# Patient Record
Sex: FEMALE | Race: WHITE | HISPANIC OR LATINO | Employment: UNEMPLOYED | URBAN - METROPOLITAN AREA
[De-identification: names, ages, dates, MRNs, and addresses within clinical notes are randomized per-mention and may not be internally consistent; named-entity substitution may affect disease eponyms.]

---

## 2023-03-17 ENCOUNTER — OFFICE VISIT (OUTPATIENT)
Dept: FAMILY MEDICINE CLINIC | Facility: CLINIC | Age: 48
End: 2023-03-17

## 2023-03-17 VITALS
DIASTOLIC BLOOD PRESSURE: 76 MMHG | TEMPERATURE: 98.1 F | OXYGEN SATURATION: 98 % | BODY MASS INDEX: 21.26 KG/M2 | HEART RATE: 64 BPM | WEIGHT: 120 LBS | HEIGHT: 63 IN | SYSTOLIC BLOOD PRESSURE: 124 MMHG | RESPIRATION RATE: 16 BRPM

## 2023-03-17 DIAGNOSIS — Z12.31 ENCOUNTER FOR SCREENING MAMMOGRAM FOR MALIGNANT NEOPLASM OF BREAST: ICD-10-CM

## 2023-03-17 DIAGNOSIS — M25.562 CHRONIC PAIN OF BOTH KNEES: ICD-10-CM

## 2023-03-17 DIAGNOSIS — Z13.1 DIABETES MELLITUS SCREENING: ICD-10-CM

## 2023-03-17 DIAGNOSIS — M54.50 CHRONIC BILATERAL LOW BACK PAIN WITHOUT SCIATICA: Primary | ICD-10-CM

## 2023-03-17 DIAGNOSIS — Z13.220 LIPID SCREENING: ICD-10-CM

## 2023-03-17 DIAGNOSIS — G89.29 CHRONIC PAIN OF BOTH KNEES: ICD-10-CM

## 2023-03-17 DIAGNOSIS — E55.9 VITAMIN D DEFICIENCY: ICD-10-CM

## 2023-03-17 DIAGNOSIS — M25.561 CHRONIC PAIN OF BOTH KNEES: ICD-10-CM

## 2023-03-17 DIAGNOSIS — G89.29 CHRONIC BILATERAL LOW BACK PAIN WITHOUT SCIATICA: Primary | ICD-10-CM

## 2023-03-17 PROBLEM — Z59.89 UNINSURED: Status: ACTIVE | Noted: 2023-03-17

## 2023-03-17 PROBLEM — Z59.71 UNINSURED: Status: ACTIVE | Noted: 2023-03-17

## 2023-03-17 NOTE — ASSESSMENT & PLAN NOTE
-Use Tylenol XR prn + Voltaren gel  -May use voltaren gel daily if prn is ineffective    -Encouraged Heat/Ice application  -Discussed regular exercise for skeletal strengthening   -Encouraged using a tibiofemoral knee brace

## 2023-03-17 NOTE — PROGRESS NOTES
Name: Alma Varela      : 1975      MRN: [id number]  Encounter Provider: KIESHA Moraes  Encounter Date: 3/17/2023   Encounter department: [address] [place] [address]     1  Chronic bilateral low back pain without sciatica  Assessment & Plan:  - Recommend PT, heat/ice application, home exercises  - May use tylenol 500 mg prn   - Advised pt to use naproxen prn if tylenol is ineffective  Orders:  -     Diclofenac Sodium (VOLTAREN) 1 %; Apply 2 g topically 4 (four) times a day    2  Encounter for screening mammogram for malignant neoplasm of breast  -     Mammo screening bilateral w 3d & cad; Future; Expected date: 2023  -     Ambulatory Referral to Social Work Care Management Program; Future    3  Chronic pain of both knees  Assessment & Plan:  -Use Tylenol XR prn + Voltaren gel  -May use voltaren gel daily if prn is ineffective    -Encouraged Heat/Ice application  -Discussed regular exercise for skeletal strengthening   -Encouraged using a tibiofemoral knee brace  Orders:  -     Diclofenac Sodium (VOLTAREN) 1 %; Apply 2 g topically 4 (four) times a day    4  Diabetes mellitus screening  -     Hemoglobin A1C; Future  -     Basic metabolic panel; Future    5  Lipid screening  -     Lipid Panel with Direct LDL reflex; Future    6  Vitamin D deficiency  -     Vitamin D 25 hydroxy; Future         Subjective        Alma Varela is a 50 y o  female  has no past medical history on file  has no past surgical history on file  Pt presents today to establish care  Knee Pain  Patient presents with knee pain involving both knees  Onset of the symptoms was several months ago  Inciting event: none known  Current symptoms include crepitus sensation, pain located generalized throughout anterior knee and stiffness  Pain is aggravated by kneeling and squatting  Patient has had no prior knee problems  Evaluation to date: none   Treatment to date: none  Back Pain  Patient presents for evaluation of low back problems  Symptoms have been present for 6 months  Denies numbness, paresthesias, stiffness and weakness  Initial inciting event: none  Symptoms are worse in the morning  Alleviating factors identifiable by the patient are none  Aggravating factors identifiable by the patient are bending forwards  Treatments initiated by the patient: heating pad which helps  Previous lower back problems: none  Previous work up: none  Review of Systems   Constitutional: Negative for chills and fever  HENT: Negative for ear pain and sore throat  Eyes: Negative for pain and visual disturbance  Respiratory: Negative for cough and shortness of breath  Cardiovascular: Negative for chest pain and palpitations  Gastrointestinal: Negative for abdominal pain and vomiting  Genitourinary: Negative for dysuria and hematuria  Musculoskeletal: Positive for arthralgias, back pain and myalgias  Skin: Negative for color change and rash  Neurological: Negative for seizures and syncope  All other systems reviewed and are negative  No current outpatient medications on file prior to visit  Objective     /76 (BP Location: Left arm, Patient Position: Sitting, Cuff Size: Standard)   Pulse 64   Temp 98 1 °F (36 7 °C) (Temporal)   Resp 16   Ht 5' 3" (1 6 m)   Wt 54 4 kg (120 lb)   SpO2 98%   BMI 21 26 kg/m²     Physical Exam  Vitals and nursing note reviewed  HENT:      Head: Normocephalic and atraumatic  Right Ear: External ear normal       Left Ear: External ear normal       Nose: Nose normal    Eyes:      Extraocular Movements: Extraocular movements intact  Conjunctiva/sclera: Conjunctivae normal       Pupils: Pupils are equal, round, and reactive to light  Cardiovascular:      Rate and Rhythm: Normal rate and regular rhythm  Pulses: Normal pulses  Heart sounds: Normal heart sounds     Pulmonary:      Effort: Pulmonary effort is normal       Breath sounds: Normal breath sounds  Abdominal:      General: Bowel sounds are normal       Palpations: Abdomen is soft  Tenderness: There is no abdominal tenderness  Musculoskeletal:         General: Normal range of motion  Cervical back: Normal and normal range of motion  Thoracic back: Normal       Lumbar back: Normal  Negative right straight leg raise test and negative left straight leg raise test       Right knee: Bony tenderness and crepitus present  Left knee: Bony tenderness and crepitus present  Skin:     General: Skin is warm and dry  Neurological:      General: No focal deficit present  Mental Status: She is alert and oriented to person, place, and time  Mental status is at baseline  Psychiatric:         Mood and Affect: Mood normal          Behavior: Behavior normal          Thought Content:  Thought content normal          Judgment: Judgment normal        Jennfier Mari

## 2023-03-17 NOTE — ASSESSMENT & PLAN NOTE
- Recommend PT, heat/ice application, home exercises  - May use tylenol 500 mg prn   - Advised pt to use naproxen prn if tylenol is ineffective

## 2023-03-17 NOTE — PATIENT INSTRUCTIONS
Ejercicios para la espalda baja   CUIDADO AMBULATORIO:   Los ejercicios para la espalda baja ayudan a sanar y a fortalecer los músculos de morales espalda para evitar otra lesión  Pregúntele a morales médico si usted necesita acudir con un fisioterapeuta para que le indique ejercicios más avanzado  Busque atención médica de inmediato si:  Usted tiene dolor severo que le impide moverse  Llame a morales médico si:  Morales dolor empeora  Usted tiene un dolor nuevo  Usted tiene preguntas o inquietudes acerca de morales condición o cuidado  Realice los ejercicios para la espalda baja de manera tobin:  Charmaine los ejercicios sobre tami colchoneta o superficie firme (no sobre tami cama)  Tami superficie firme sostendrá morales columna y evitará el dolor lumbar  Muévase lenta y suavemente  Evite movimientos rápidos o bruscos  Respire normalmente  No contenga la respiración  Deténgase si siente dolor  Es normal sentir alguna molestia al principio, octavio no debería sentir dolor  Practicar los ejercicios con regularidad ayudará a disminuir morales incomodidad con el paso del [place]  Ejercicios para la espalda baja: Morales médico podría recomendarle que realice ejercicios para la espalda de 10 a 30 minutos cada día  También podría recomendarle que charmaine ejercicios de 1 a 3 veces cada día  Pregunte a morales médico cuáles ejercicios son los mejores para usted y con qué frecuencia hacerlos  Bombeo del tobillo: Acuéstese boca arriba  Levante morales pie (con carol dedos apuntando hacia morales vibha)  Luego, baje morales pie (con los dedos apuntando lejos de usted)  Repita yashira ejercicio 10 veces en cada lado  Deslizamiento de talón: Acuéstese boca arriba  Muy despacio doble tami pierna y luego enderécela  Luego, doble la otra pierna y enderécela  Repita 10 veces en cada lado  Inclinación pélvica: Acuéstese boca arriba con carol rodillas dobladas y carol pies planos sobre el piso  Coloque carol brazos en tami posición relajada junto a morales cuerpo   Contraiga los músculos de morales abdomen y aplane morales espalda contra el piso  Sostenga está posición por 5 segundos  Repita 5 veces  Estiramiento de la espalda: Acuéstese boca arriba con carol brian detrás de morales vibha  Doble carol rodillas y gire la mitad de morales cuerpo hacia un lado  Mantenga esta posición por 10 segundos  Repita 3 veces en cada lado  Levantamiento de la pierna estirada: Acuéstese boca Raiza Karie con tami pierna estirada  Doble la otra rodilla  Contraiga morales abdomen y luego levante lentamente la pierna estirada entre 6 a 12 pulgadas del piso  Mantenga esta posición por 1 a 5 segundos  Baje morales pierna lentamente  Repita 10 veces en cada pierna  Rodillas al pecho: Acuéstese boca arriba con carol rodillas dobladas y carol pies planos sobre el piso  Denia Bream tami de las rodillas hacia morales pecho y sosténgala por 5 segundos  Regrese morales pierna a la posición inicial  Levante la otra rodilla hacia el pecho y sosténgala por 5 segundos  Mk esto 5 veces en cada lado  Posición luis camello: Coloque carol brian y [employer]  Arquee morales espalda [place], hacia el techo y [place]  Arquee morales esme dorsal lo más posible  Sostenga está posición por 5 segundos  Levante morales vibha hacia arriba y baje morales pecho hacia el piso  Sostenga está posición por 5 segundos  Mk 3 series o vinh se le indique  Posición de cuclillas contra la pared: Párese con morales espalda contra la pared  Contraiga los músculos de morales abdomen  Lentamente deslice morales cuerpo hasta que carol rodillas queden dobladas en un ángulo de 45 grados  Mantenga esta posición por 5 segundos  Deslice lentamente morales espalda hacia arriba hasta quedar de pie  Repita 10 veces  Posición de acurrucarse: Acuéstese boca arriba con carol rodillas dobladas y carol pies planos sobre el piso  Coloque carol brian con las yenifer hacia abajo debajo de la curva de la parte baja de morales espalda   Después, con carol codos [facility], levante carol hombros y pecho entre 2 a 3 pulgadas  Mantenga morales vibha a la misma altura de carol hombros  Mantenga esta posición por 5 segundos  Cuando usted pueda hacer yashira ejercicio sin sentir dolor por 10 a 15 segundos, puede entonces añadir tami rotación  Mientras carol hombros y morales pecho estén levantados del piso, voltee levemente hacia la izquierda y [place]  Repita en el otro lado  Ejercicio pájaro andrzej: Coloque carol brian y rodillas sobre el piso  Mantenga carol muñecas directamente debajo de carol hombros y carol rodillas directamente debajo de carol caderas  Contraiga morales ombligo hacia adentro en dirección a morales columna  No estire ni arquee morales espalda  Ponga tensos carol músculos abdominales  Levante un brazo extendido para que se alinee con morales vibha  Luego, levante la pierna opuesta a morales brazo  Mantenga esta posición por 15 segundos  Baje morales Flonnie Push y pierna lentamente y [place] de lado  Mk 5 series  Acuda a la consulta de control con morales médico según las indicaciones: Anote carol preguntas para que se acuerde de hacerlas fredy carol visitas  © Copyright Omelia Passy 2022 Information is for End User's use only and may not be sold, redistributed or otherwise used for commercial purposes  Esta información es sólo para uso en educación  Morales intención no es darle un consejo médico sobre enfermedades o tratamientos  Colsulte con morales [place] farmacéutico antes de seguir cualquier régimen médico para saber si es seguro y efectivo para usted

## 2023-07-27 ENCOUNTER — PATIENT OUTREACH (OUTPATIENT)
Dept: FAMILY MEDICINE CLINIC | Facility: CLINIC | Age: 48
End: 2023-07-27

## 2023-07-27 NOTE — PROGRESS NOTES
Chart review indicates that an order was placed 3/17/23 to follow up with uninsured patient who needs mammogram. No prior OP SWCM involvement found. Guarantor notes indicate that patient has approved [address] SFS application from 3/60/71-0/36/06. SWCM outreached patient via [employer] ID # [id number], but patient did not  and unable to leave . Attempted x2. SWCM to try again at another time.

## 2023-08-29 ENCOUNTER — PATIENT OUTREACH (OUTPATIENT)
Dept: FAMILY MEDICINE CLINIC | Facility: CLINIC | Age: 48
End: 2023-08-29

## 2023-08-29 NOTE — PROGRESS NOTES
Catheter inserted. Chart review indicates that an order was placed 3/17/23 to follow up with uninsured patient who needs mammogram. No prior OP [facility] involvement found. Guarantor notes indicate that patient has approved [facility] SFS application from 8/15/89-1/50/51. SW outreached patient via [employer] ID # [id number] on 7/27/23, but patient did not  and unable to leave . Attempted x2. SW attempted patient again today via [employer] AL#[id number] to her spouse phone as she has none on file, patient/spuse did not  and unable to leave . At this time due to two unsuccessful attempts and no upcoming scheduled appointment [facility] will have colleague send unable to reach letter to the home. Patient does not have an e-mail on file or active My Chart. [facility] will close out case as this time, but remain available in the future as needed.

## 2023-08-30 ENCOUNTER — PATIENT OUTREACH (OUTPATIENT)
Dept: FAMILY MEDICINE CLINIC | Facility: CLINIC | Age: 48
End: 2023-08-30

## 2023-08-30 NOTE — LETTER
08/30/23    Estimado/a Yair Cm un coordinador de la atención médica en [facility] [address] [place] [address]  [address] [facility] [address] [phone]. Intentamos comunicarnos con usted por teléfono varias veces. Es importante que se comunique con nosotros al [phone] para que podamos ofrecerle ayuda con carol necesidades de [facility]. Atentamente.        MEDARDO Garner

## 2023-08-30 NOTE — PROGRESS NOTES
EFRAIN JEAN BAPTISTE was made aware that EFRAIN  was unable to get in contact with pt. EFRAIN JEAN BAPTISTE sent unable to reach letter to address on file. EFRAIN  will be available for any additional needs as requested.

## 2025-01-13 ENCOUNTER — TELEPHONE (OUTPATIENT)
Dept: FAMILY MEDICINE CLINIC | Facility: CLINIC | Age: 50
End: 2025-01-13

## 2025-01-13 NOTE — TELEPHONE ENCOUNTER
first attempt to contact patient. no answer left message to return my call on answering machine